# Patient Record
Sex: FEMALE | Race: WHITE | NOT HISPANIC OR LATINO
[De-identification: names, ages, dates, MRNs, and addresses within clinical notes are randomized per-mention and may not be internally consistent; named-entity substitution may affect disease eponyms.]

---

## 2017-04-18 ENCOUNTER — APPOINTMENT (OUTPATIENT)
Dept: ORTHOPEDIC SURGERY | Facility: CLINIC | Age: 57
End: 2017-04-18

## 2017-04-18 VITALS — HEART RATE: 53 BPM | SYSTOLIC BLOOD PRESSURE: 124 MMHG | DIASTOLIC BLOOD PRESSURE: 75 MMHG

## 2017-04-18 VITALS — HEIGHT: 60 IN | WEIGHT: 103 LBS | BODY MASS INDEX: 20.22 KG/M2

## 2017-04-18 DIAGNOSIS — Z85.3 PERSONAL HISTORY OF MALIGNANT NEOPLASM OF BREAST: ICD-10-CM

## 2017-04-18 DIAGNOSIS — M65.331 TRIGGER FINGER, RIGHT MIDDLE FINGER: ICD-10-CM

## 2017-04-18 DIAGNOSIS — Z78.9 OTHER SPECIFIED HEALTH STATUS: ICD-10-CM

## 2017-04-18 DIAGNOSIS — M65.332 TRIGGER FINGER, LEFT MIDDLE FINGER: ICD-10-CM

## 2017-04-18 DIAGNOSIS — Z56.0 UNEMPLOYMENT, UNSPECIFIED: ICD-10-CM

## 2017-04-18 DIAGNOSIS — S69.81XD OTHER SPECIFIED INJURIES OF RIGHT WRIST, HAND AND FINGER(S), SUBSEQUENT ENCOUNTER: ICD-10-CM

## 2017-04-18 RX ORDER — ESCITALOPRAM OXALATE 10 MG/1
10 TABLET ORAL
Qty: 30 | Refills: 0 | Status: ACTIVE | COMMUNITY
Start: 2017-03-11

## 2017-04-18 RX ORDER — CLOBETASOL PROPIONATE 0.5 MG/G
0.05 OINTMENT TOPICAL
Qty: 60 | Refills: 0 | Status: ACTIVE | COMMUNITY
Start: 2017-02-17

## 2017-04-18 SDOH — ECONOMIC STABILITY - INCOME SECURITY: UNEMPLOYMENT, UNSPECIFIED: Z56.0

## 2017-08-31 ENCOUNTER — APPOINTMENT (OUTPATIENT)
Dept: BREAST CENTER | Facility: CLINIC | Age: 57
End: 2017-08-31
Payer: COMMERCIAL

## 2017-08-31 VITALS
SYSTOLIC BLOOD PRESSURE: 120 MMHG | HEART RATE: 62 BPM | HEIGHT: 60 IN | DIASTOLIC BLOOD PRESSURE: 82 MMHG | BODY MASS INDEX: 20.62 KG/M2 | WEIGHT: 105 LBS

## 2017-08-31 DIAGNOSIS — Z86.000 PERSONAL HISTORY OF IN-SITU NEOPLASM OF BREAST: ICD-10-CM

## 2017-08-31 PROCEDURE — 99213 OFFICE O/P EST LOW 20 MIN: CPT

## 2018-08-07 ENCOUNTER — APPOINTMENT (OUTPATIENT)
Dept: BREAST CENTER | Facility: CLINIC | Age: 58
End: 2018-08-07
Payer: COMMERCIAL

## 2018-08-07 VITALS
HEIGHT: 60 IN | BODY MASS INDEX: 20.62 KG/M2 | DIASTOLIC BLOOD PRESSURE: 84 MMHG | WEIGHT: 105 LBS | SYSTOLIC BLOOD PRESSURE: 126 MMHG

## 2018-08-07 PROCEDURE — 99214 OFFICE O/P EST MOD 30 MIN: CPT

## 2018-08-07 RX ORDER — ASCORBIC ACID 500 MG
TABLET,CHEWABLE ORAL
Refills: 0 | Status: ACTIVE | COMMUNITY

## 2018-08-07 RX ORDER — GLUCOSAMINE/MSM/CHONDROIT SULF 500-166.6
TABLET ORAL
Refills: 0 | Status: ACTIVE | COMMUNITY

## 2019-05-16 ENCOUNTER — CHART COPY (OUTPATIENT)
Age: 59
End: 2019-05-16

## 2019-08-29 ENCOUNTER — APPOINTMENT (OUTPATIENT)
Dept: BREAST CENTER | Facility: CLINIC | Age: 59
End: 2019-08-29
Payer: COMMERCIAL

## 2019-08-29 VITALS
WEIGHT: 105 LBS | BODY MASS INDEX: 20.62 KG/M2 | HEIGHT: 60 IN | SYSTOLIC BLOOD PRESSURE: 121 MMHG | DIASTOLIC BLOOD PRESSURE: 74 MMHG | HEART RATE: 55 BPM

## 2019-08-29 DIAGNOSIS — Z86.000 PERSONAL HISTORY OF IN-SITU NEOPLASM OF BREAST: ICD-10-CM

## 2019-08-29 PROCEDURE — 99214 OFFICE O/P EST MOD 30 MIN: CPT

## 2019-08-29 NOTE — PHYSICAL EXAM
[Normocephalic] : normocephalic [Sclera nonicteric] : sclera nonicteric [Conjunctiva pink] : conjunctiva pink [Supple] : supple [No Supraclavicular Adenopathy] : no supraclavicular adenopathy [No Cervical Adenopathy] : no cervical adenopathy [No Thyromegaly] : no thyromegaly [Clear to Auscultation Bilat] : clear to auscultation bilaterally [Normal Sinus Rhythm] : normal sinus rhythm [No Rubs] : no pericardial rub [No Gallops] : no gallops [Examined in the supine and seated position] : examined in the supine and seated position [Symmetrical] : symmetrical [Grade 1] : Ptosis Grade 1 [No dominant masses] : no dominant masses in right breast  [No dominant masses] : no dominant masses left breast [No Nipple Retraction] : no left nipple retraction [No Nipple Discharge] : no left nipple discharge [No Axillary Lymphadenopathy] : no left axillary lymphadenopathy [Soft] : abdomen soft [Normal Bowel Sounds] : normal bowel sounds  [No Hepato-Splenomegaly] : no hepato-splenomegaly [No Swelling] : no swelling [de-identified] : Periareolar lumpectomy incision at 12:00.

## 2019-08-29 NOTE — HISTORY OF PRESENT ILLNESS
[FreeTextEntry1] : 59 year old female with a history of DCIS of the left breast presents for a surveillance breast examination.\par She underwent a left lumpectomy/radiation therapy in 2006 at age 45.  She had a negative genetic panel.

## 2019-08-29 NOTE — PAST MEDICAL HISTORY
[Menarche Age ____] : age at menarche was [unfilled] [Definite ___ (Date)] : the last menstrual period was [unfilled] [Total Preg ___] : G[unfilled] [FreeTextEntry3] : Irregular

## 2021-05-31 NOTE — CONSULT LETTER
CONSULTATION NOTE - GENERAL NEUROLOGY    CHIEF COMPLAINT  Tingling in the left fingers and left toes    HISTORY OF PRESENT ILLNESS  Ms. Chiu is a  69 year old year old female, with a significant past medical history of rheumatoid arthritis, fibromyalgia, osteoporosis, cervical cancer, and shingles who is being seen in neurologic consultation on 5/31/2021 for tingling in the left hand and left foot.   Patient originally presented to [facility] on 5/30/2021 for tingling and history was obtained from review of provider notes and speaking to the patient.      On 5/30/21, patient started having tingling in the left hand and left foot at 5:30PM which lasted until 11PM and then resolved. Symptoms started in the hand first (tingling in all her fingers) and then she had it in the foot. Patient also had a slight headache which she rated a 1-2/10 which then resolved spontaneously. She denies any nausea, vomiting, photophobia or phonophobia with her symptoms. She denies facial droop, slurring of speech, left sided weakness. In the ER, CT of the head was within normal limits.  MRI of the brain was normal. MRA of the head and neck were normal.     Currently, patient has no complaints at this time.     HISTORIES:  Patient Active Problem List    Diagnosis Date Noted   • Rheumatoid arthritis (CMS/Formerly [facility]) 05/13/2021     Priority: Low   • Environmental allergies 09/24/2020     Priority: Low   • Acquired trigger finger of both middle fingers 09/07/2020     Priority: Low   • Osteopenia of multiple sites 11/09/2019     Priority: Low   • Esophageal reflux 03/27/2019     Priority: Low   • Allergic rhinitis 03/27/2019     Priority: Low   • Family history of cardiac disorder 03/27/2019     Priority: Low   • FH: diabetes mellitus 03/27/2019     Priority: Low   • Malignant tumor of cervix (CMS/Formerly [facility]) 03/27/2019     Priority: Low   • Non-neoplastic nevus 03/27/2019     Priority: Low   • Pure hypercholesterolemia 03/27/2019      [Dear  ___] : Dear ~GINGER, Priority: Low   • Vitamin D deficiency 03/27/2019     Priority: Low   • Fibromyalgia 02/13/2019     Priority: Low   • Osteopenia of femoral neck 02/13/2019     Priority: Low   • Seasonal allergies 08/18/2015     Priority: Low       Past Medical History:   Diagnosis Date   • Esophageal reflux    • Fibromyalgia    • Osteopenia    • Vitiligo        No past surgical history on file.    Family History   Problem Relation Age of Onset   • Dementia/Alzheimers Mother    • Kidney disease Father    • Myocardial Infarction Father    • Coronary Artery Disease Father 60        CABG, Carotids   • Cirrhosis Paternal Grandmother    • Cancer, Pancreatic Other    • Cystic Fibrosis Neg Hx    • Stomach Cancer Neg Hx    • Cancer, Rectal Neg Hx        Social History     Tobacco Use   • Smoking status: Never Smoker   • Smokeless tobacco: Never Used   Substance Use Topics   • Alcohol use: Yes     Alcohol/week: 1.0 standard drinks     Types: 1 Glasses of wine per week     Comment: every night   • Drug use: Not Currently       INPATIENT MEDICATIONS:  • sodium chloride (PF)  2 mL Intracatheter 2 times per day   • amitriptyline  30 mg Oral Nightly   • PARoxetine  20 mg Oral Daily   • pantoprazole  40 mg Oral QAM AC   • moxifloxacin  400 mg Oral Daily   • folic acid  1 mg Oral Daily   • Non-formulary  1 each Oral See Admin Instructions       As needed meds: sodium chloride     Continuous meds:        HOME MEDICATIONS:  Medications Prior to Admission   Medication Sig Dispense Refill   • METHOTREXATE PO      • FOLIC ACID PO      • famotidine (PEPCID) 20 MG tablet Take 1 tablet by mouth 2 times daily. Take 30 min before breakfast and 30 min before dinner 180 tablet 3   • LORazepam (ATIVAN) 0.5 MG tablet Take 1 tablet 35-45 minutes before PET scan. May repeat x 1 right before the scan. 2 tablet 0   • colchicine (COLCRYS) 0.6 MG tablet      • ketoconazole (NIZORAL) 2 % shampoo      • predniSONE (DELTASONE) 10 MG tablet      • omeprazole (PriLOSEC) 40  [Courtesy Letter:] : I had the pleasure of seeing your patient, [unfilled], in my office today. MG capsule Take 1 capsule by mouth daily. Take 30 minutes before breakfast 30 capsule 1   • amitriptyline (ELAVIL) 10 MG tablet TAKE 3 TABLETS NIGHTLY 270 tablet 0   • PARoxetine (PAXIL) 20 MG tablet TAKE 1 TABLET AS DIRECTED 90 tablet 0   • Cholecalciferol (VITAMIN D3 PO) Take 2,000 Units by mouth.     • azelastine (ASTELIN) 0.1 % nasal spray INSERT 2 SQUIRTS IN EACH NOSTRIL TWICE DAILY     • mometasone (NASONEX) 50 MCG/ACT nasal spray 2 SPRAYS BID     • Multiple Vitamins-Minerals (PRESERVISION AREDS) capsule 1 TABLET TWICE A DAY         ALLERGIES:  Allergies as of 05/30/2021 - Reviewed 05/30/2021   Allergen Reaction Noted   • Amoxicillin Other (See Comments) and RASH 02/02/2016   • Coffee   (food or med) Cough 09/24/2020   • Dust mite extract Other (See Comments) 05/18/2018   • No name available Other (See Comments) 05/18/2018        REVIEW OF SYSTEMS :   10 point review of systems was unremarkable other than as documented in the HPI.    PHYSICAL EXAM  Vitals:  BP (!) 141/78   Pulse 82   Temp 97.9 °F (36.6 °C) (Oral)   Resp 16   Ht 5' 5\" (1.651 m)   Wt 60.1 kg (132 lb 7.9 oz)   BMI 22.05 kg/m²   BSA 1.66 m²   General: Well-developed, well-nourished female.  In no acute distress.  Head & Neck:  Normocephalic and atraumatic.   No carotid bruits.   EENT: Normal conjunctiva.  No nasal drainage.   Normal appearance to ear structures.  Heart: Normal rate, regular rhythm without murmur.  Lungs:  Clear to auscultation bilaterally.  No rales, rhonchi or rubs.  Extremities:  No edema in lower extremities. No amputations.  Musculoskeletal:  No bony tenderness or deformities.   Skin:  Warm and dry.  No obvious rashes noted.  Psychiatric:  Affect was appropriate to situation and mood was normal.  Neurologic:  (conscious exam)  Mentation: Alert and awake. Oriented to person and place. Speech is fluent without any anomia or dysarthria.  Patient is able to provide adequate history with a good fund of knowledge.  Short and  [Please see my note below.] : Please see my note below. [Sincerely,] : Sincerely, long term memory was within normal range for age.  No evidence of obvious cognitive impairment.   Cranial Nerves:    CN I: Not tested.  CN II: Visual acuity grossly intact. Visual fields full to confrontational testing. Funduscopic exam reveals normal discs, vessels and retina.  Pupils equal and reactive (CN II/III)  CN III, IV, VI: Extraocular muscles are intact without nystagmus or gaze paresis. There is no ptosis.  CN V: Facial sensation intact V1-V3 bilaterally.  Muscles of mastication normal.  CN VII: Facial movement full and symmetric.  CN VIII: Hearing intact to a speaking voice.  CN IX, X: Palate elevates in the midline, normal gag, swallow, and phonation.  CN XI: Sternocleidomastoid and trapezius strength is symmetric and full  CN XII: Tongue protrudes midline without atrophy or fasciculations.  Cerebellar exam:  Finger-nose-finger and rapid alternating movements are intact.  Tandem gait is normal.  Romberg steady eyes open and closed.  Motor exam (including gait and reflexes):  Normal gait with abililty to walk unassisted.  Able to walk on heels and toes without any difficulties. Strength in all 4 extremities is 5/5.  Reflex examination was 2/4 in upper and lower extremities.  Plantar response was flexor.  Sensory exam:  Normal sensation to pin prick, temperature and vibration in all 4 extremities.      LABORATORY:  I have reviewed the pertinent laboratory tests:    Recent Labs   Lab 05/30/21 1954   WBC 9.5   RBC 4.25   HGB 14.0   HCT 40.1        Recent Labs   Lab 05/30/21 1954   SODIUM 140   POTASSIUM 3.4   CHLORIDE 105   CO2 31   BUN 21*   CREATININE 0.75   CALCIUM 9.0   ALBUMIN 3.8   BILIRUBIN 0.3   ALKPT 85   GPT 36   AST 26   GLUCOSE 137*     No results found  No results found  Hemoglobin A1C (%)   Date Value   02/01/2021 5.4   ]    IMAGING/OTHER TESTING:  I have reviewed the pertinent imaging/study reports.      XR CHEST PA OR AP 1 VIEW   Final Result      No significant interval change.          Electronically Signed by: DILIA AGUILAR M.D.    Signed on: 5/31/2021 12:42 AM          MRI BRAIN W WO CONTRAST   Final Result      Normal MRI of the brain.      Normal MRA of the brain and neck.            Electronically Signed by: DILIA AGUILAR M.D.    Signed on: 5/30/2021 11:24 PM          MRA HEAD WO CONTRAST   Final Result      Normal MRI of the brain.      Normal MRA of the brain and neck.            Electronically Signed by: DILIA AGUILAR M.D.    Signed on: 5/30/2021 11:24 PM          MRA NECK W WO CONTRAST   Final Result      Normal MRI of the brain.      Normal MRA of the brain and neck.            Electronically Signed by: DILIA AGUILAR M.D.    Signed on: 5/30/2021 11:24 PM          XR CHEST PA OR AP 1 VIEW         CT HEAD WO CONTRAST   Final Result   No abnormalities.       Electronically Signed by: IWONA HIGGINBOTHAM M.D.    Signed on: 5/30/2021 9:39 PM                      ASSESSMENT/PLAN  This is a  69 year old  female, with a sig PMHx of rheumatoid arthritis, fibromyalgia, osteoporosis, cervical cancer, and shingles being seen in neurologic consultation on 5/31/2021 for  Tingling in the left hand and foot.  Patient was orginally admitted on 5/30/2021 for tingling.     1. Tingling sensations in the left hand and foot and associated headache- resolved  - Doubt right hemispheric stroke or ischemic event. MRI of the brain, MRA of the head and neck were negative. Unlikely to be medication side effect from Avelox (would not cause focal deficits) nor due to spinal stenosis (patient denies radiating pain)  - Patient can get an EMG as outpatient for further evaluation for neuropathy.    3. Multiple comorbidities- rheumatoid arthritis, fibromyalgia, osteoporosis, cervical cancer, and shingles    Plan  - No need for repeat MRI or further ischemic workup.   - EMG as outpatient with Dr. Shelton, Dr. Goins, or Dr. Dolan  - Patient is neurologically stable for discharge. No further recommendations. Will  [FreeTextEntry2] : Elly Herrera DO [FreeTextEntry3] : Tammi Bowden MD FACS\par  sign off.       .Rinku Byers DO   (available via [employer])  [facility] - Neurology Attending  Date: 5/31/2021

## 2024-08-23 ENCOUNTER — EMERGENCY (EMERGENCY)
Facility: HOSPITAL | Age: 64
LOS: 0 days | Discharge: HOME | End: 2024-08-23
Attending: STUDENT IN AN ORGANIZED HEALTH CARE EDUCATION/TRAINING PROGRAM
Payer: COMMERCIAL

## 2024-08-23 VITALS
OXYGEN SATURATION: 99 % | TEMPERATURE: 98 F | RESPIRATION RATE: 16 BRPM | SYSTOLIC BLOOD PRESSURE: 133 MMHG | HEART RATE: 62 BPM | DIASTOLIC BLOOD PRESSURE: 70 MMHG

## 2024-08-23 VITALS — HEIGHT: 60 IN | WEIGHT: 106.92 LBS
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
## 2024-08-23 DIAGNOSIS — M25.532 PAIN IN LEFT WRIST: ICD-10-CM

## 2024-08-23 DIAGNOSIS — S52.502A UNSPECIFIED FRACTURE OF THE LOWER END OF LEFT RADIUS, INITIAL ENCOUNTER FOR CLOSED FRACTURE: ICD-10-CM
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
## 2024-08-23 PROCEDURE — 99285 EMERGENCY DEPT VISIT HI MDM: CPT

## 2024-08-23 PROCEDURE — 25605 CLTX DST RDL FX/EPHYS SEP W/: CPT | Mod: LT

## 2024-08-23 PROCEDURE — 99285 EMERGENCY DEPT VISIT HI MDM: CPT | Mod: 25

## 2024-08-23 PROCEDURE — 73090 X-RAY EXAM OF FOREARM: CPT | Mod: LT

## 2024-08-23 PROCEDURE — 73090 X-RAY EXAM OF FOREARM: CPT | Mod: 26,LT

## 2024-08-23 PROCEDURE — 73130 X-RAY EXAM OF HAND: CPT | Mod: LT

## 2024-08-23 PROCEDURE — 73130 X-RAY EXAM OF HAND: CPT | Mod: 26,LT

## 2024-08-23 PROCEDURE — 73110 X-RAY EXAM OF WRIST: CPT | Mod: LT

## 2024-08-23 PROCEDURE — 73110 X-RAY EXAM OF WRIST: CPT | Mod: 26,LT,76

## 2024-08-23 RX ORDER — ACETAMINOPHEN 500 MG
1000 TABLET ORAL ONCE
Refills: 0 | Status: COMPLETED | OUTPATIENT
Start: 2024-08-23 | End: 2024-08-23

## 2024-08-23 RX ADMIN — Medication 1000 MILLIGRAM(S): at 20:45

## 2024-08-23 NOTE — ED STATDOCS - PHYSICAL EXAMINATION
GENERAL: A&Ox4, non-toxic appearing, no acute distress  HEENT: NCAT, EOMI, oral mucosa moist, normal conjunctiva  RESP: no respiratory distress, speaking in full sentences  CV: RRR, 2+ radial pulses  MSK: Swelling of the left distal radius on the volar aspect with bony tenderness, compartments soft  NEURO: no focal sensory or motor deficits, CN 2-12 grossly intact, AIN/PIN/R/U intact, SILT  SKIN: warm, normal color, well perfused, no rash  PSYCH: normal affect

## 2024-08-23 NOTE — ED STATDOCS - CLINICAL SUMMARY MEDICAL DECISION MAKING FREE TEXT BOX
64-year-old female presenting with fall on outstretched hand, complaining of left wrist pain.  Has swelling to the dorsal aspect of the left distal radius with bony tenderness concerning for fracture versus sprain.  Plan for Tylenol, x-rays, splint, reassess.

## 2024-08-23 NOTE — ED STATDOCS - CARE PROVIDER_API CALL
Sarbjit Tobar.  Orthopaedic Surgery  [address] [place] [address]  Phone: [phone]  Fax: [phone]  Follow Up Time:

## 2024-08-23 NOTE — ED ADULT NURSE NOTE - CHIEF COMPLAINT
The patient is a 64y Female complaining of wrist pain/injury.
Alert and oriented, no focal deficits, no motor or sensory deficits.

## 2024-08-23 NOTE — ED STATDOCS - PATIENT PORTAL LINK FT
You can access the FollowMyHealth Patient Portal offered by [facility] by registering at the following website: [email or web address]. By joining [facility]’s FollowMyHealth portal, you will also be able to view your health information using other applications (apps) compatible with our system.

## 2024-08-23 NOTE — CONSULT NOTE ADULT - SUBJECTIVE AND OBJECTIVE BOX
64y L Hand Dominant retired. Female patient presents to [facility] ED c/o severe L wrist pain s/p mechanical fall. She tripped over shoes in a hotel. Patient denies head hit or LOC. Localizing pain to distal radius. Denies radiation of pain. Pt denies numbness, tingling or burning. Patient denies any other injuries. Lives in [place] - is visiting for her HS reunion.    PMH:    PSH:    AH:  No Known Allergies    Meds: See med rec    T(C): 36.7 (08-23-24 @ 19:20)  HR: 64 (08-23-24 @ 19:20)  BP: 144/68 (08-23-24 @ 19:20)  RR: 20 (08-23-24 @ 19:20)  SpO2: 100% (08-23-24 @ 19:20)  Wt(kg): --    PE :  Gen: NAD, A/Ox3, Following commands    LUE:  Skin intact, visible deformity of wrist, + soft tissue swelling, no ecchymosis  Decreased ROM of Wrist 2/2 pain  Normal Elbow/Shoulder ROM w/o pain  + TTP over distal radius  No Snuff box TTP  + Rad Pulse   SILT C5-T1  +AIN/PIN/Ulnar/Radial/Musc/Median  compartments soft and compressible    Secondary Survey:   No TTP over bony prominences, SILT, palpable pulses, full/painless A/PROM, compartments soft. No TTP over spinous processes or paraspinal muscles at C/T/L spine. No palpable step off. No other injuries or complaints. Able to SLR BL. Negative logroll/heelstrike BL. Ambulating w/o assistance/pain.    Imaging:  XRay L Wrist  3 views of L Wrist demonstrates L distal radius fracture, intra-articular w/ posterior displacement of carpus/hand in relation to forearm. No other fx/dislocations noted.     Procedure Note:  After verbal consent obtained, ~10cc of 1% Lidocaine injected into area around DR/Ulna as hematoma block. LUE hung by fingers and reduction maneuver performed. A well padded sugartong splint was applied to Forearm/Wrist and mold held. SD XR obtained which show improved alignment of Fracture. Post reduction NV exam unchanged. Pt tolerated procedure well.    A/P:   64yFemale s/p Mech Fall w/ L Distal Radius Fracture s/p closed reduction and splinting.    -Pain control  -Strict Ice/Elevation to help with swelling  -NWB LUE with splint and sling  -Keep splint clean/dry/intact;  -Encourage active finger motion to help with swelling  -Pt aware of possible need for surgical intervention of distal radius fracture. Will FU as outpatient  -Pt made aware of signs and symptoms of compartment syndrome and acute carpal tunnel syndrome. Aware of need to return to ED if symptoms develop.  -Recommend follow up outpatient w/ Dr. Tobar in 2-3 days. Call office to schedule appointment.  -All Patient's and Family Member's questions answered. Patient/family understand and agree w/ plan.  -Will discuss w/ attending and advise if plan changes.  -Imaging and clinical presentation discussed w/ Dr. Tobar who is aware and agrees w/ above plan.

## 2024-08-23 NOTE — ED ADULT NURSE NOTE - NSFALLRISKINTERV_ED_ALL_ED

## 2024-08-23 NOTE — ED STATDOCS - PROGRESS NOTE DETAILS
signed Aylin Goddard PA-C Pt seen and evaluated initially in intake by Dr. Taylor.   Pt with distal radius fx s/p fall. I spoke to Dr Tobar, resident will see pt in ED to splint/reduce. signed Aylin Goddard PA-C   Pt reduced and splinted in ED by resident Brijesh. Pt may f/u as outpt with Ekaterina on Monday. pt lives in [place] but is not sure when she is going back. Pt was up here visiting friends for a high school reunion. Pt declines rx for analgesia. friends driving her home. return precautions given. Pt feeling well at DC, agrees with DC and plan of care.

## 2024-08-23 NOTE — ED STATDOCS - OBJECTIVE STATEMENT
64 year old female with PMHx of ; presents to ED c/o left wrist injury. Patient states she tripped on a shoe in her hotel room and fell onto LUE. No other injuries. Denies head-strike, LOC.

## 2024-08-23 NOTE — ED STATDOCS - NSFOLLOWUPINSTRUCTIONS_ED_ALL_ED_FT
FOLLOW UP WITH DR SAUCEDO ON MONDAY. CALL THE OFFICE TO MAKE AN APPOINTMENT. RETURN TO ER FOR ANY WORSENING SYMPTOMS OR NEW CONCERNS.     Wrist Fracture Treated With Immobilization     A wrist fracture is a break or crack in one of the bones of the wrist. The wrist is made up of eight small bones at the palm of the hand (carpal bones) and two long bones that make up the forearm (radius and ulna).  If the joint is stable and the bones are still in their normal position (nondisplaced), the injury may be treated with immobilization. This involves the use of a cast, splint, or sling to hold the wrist in place. Immobilization ensures that the bones continue to stay in the correct position while the wrist is healing.  What are the causes?  This condition may be caused by:  A direct force to the wrist.Falling on an outstretched hand.Trauma, such as a car accident or a fall.What increases the risk?  The following factors may make you more likely to develop this condition:  Doing contact sports or high-risk sports such as skiing, biking, and ice skating.Taking steroid medicines.Smoking.Being female.Being .Drinking more than three alcoholic beverages per day.Having a condition that weakens the bones (osteoporosis).Being older.Having a history of previous fractures.What are the signs or symptoms?  Symptoms of this condition include:  Pain.Swelling.Bruising.Not being able to move the wrist normally.Additionally, the wrist may hang in an odd position or appear deformed.  How is this diagnosed?  This condition may be diagnosed based on a physical exam and X-rays. You may also have a CT scan or MRI.  How is this treated?  Treatment for this condition involves wearing a cast, splint, or sling until the injured area is stable enough for you to begin range-of-motion exercises. You may also be prescribed pain medicine.  Follow these instructions at home:  If you have a cast:     Do not stick anything inside the cast to scratch your skin. Doing that increases your risk of infection.Check the skin around the cast every day. Tell your health care provider about any concerns.You may put lotion on dry skin around the edges of the cast. Do not put lotion on the skin underneath the cast.Keep the cast clean and dry.If you have a splint or sling:     Wear the splint or sling as told by your health care provider. Remove it only as told by your health care provider.Loosen the splint or sling if your fingers tingle, become numb, or turn cold and blue.Keep the splint or sling clean and dry.Bathing     Do not take baths, swim, or use a hot tub until your health care provider approves. Ask your health care provider if you may take showers. You may only be allowed to take sponge baths.If your cast, splint, or sling is not waterproof:  Do not let it get wet.Cover it with a watertight covering when you take a bath or a shower.If you have a sling, remove it for bathing only if your health care provider tells you it is safe to do that.Managing pain, stiffness, and swelling        If directed, put ice on the injured area.  If you have a removable splint or sling, remove it as told by your health care provider.Put ice in a plastic bag.Place a towel between your skin and the bag or between your cast and the bag.Leave the ice on for 20 minutes, 2–3 times a day.Move your fingers often to avoid stiffness and to lessen swelling.Raise (elevate) the injured area above the level of your heart while you are sitting or lying down.Driving     Do not drive or use heavy machinery while taking prescription pain medicine.Ask your health care provider when it is safe to drive if you have a cast, splint, or sling on your wrist.Activity     Return to your normal activities as told by your health care provider. Ask your health care provider what activities are safe for you.Do not lift with your injured wrist until your health care provider approves.Do range-of-motion exercises only as told by your health care provider or physical therapist.General instructions     Do not put pressure on any part of the cast or splint until it is fully hardened. This may take several hours.Take over-the-counter and prescription medicines only as told by your health care provider.Do not use any products that contain nicotine or tobacco, such as cigarettes, e-cigarettes, and chewing tobacco. These can delay bone healing. If you need help quitting, ask your health care provider.If you were prescribed pain medicine, take steps to prevent or treat constipation. Your health care provider may recommend that you:  Drink enough fluid to keep your urine pale yellow.Take over-the-counter or prescription medicines.Eat foods that are high in fiber, such as beans, whole grains, and fresh fruits and vegetables.Limit foods that are high in fat and processed sugars, such as fried or sweet foods.Keep all follow-up visits as told by your health care provider. This is important.Contact a health care provider if:  Your cast, splint, or sling is damaged or loose.You have any new pain, swelling, or bruising.Your pain, swelling, and bruising do not improve.You have a fever.You have chills.Get help right away if:  Your skin or fingers on your injured arm turn blue or gray.Your arm feels cold or numb.You have severe pain in your injured wrist.Summary  A wrist fracture is a break or crack in one of the bones of the wrist.If the joint is stable and the bones are still in their normal position, the injury may be treated by wearing a cast, splint, or sling.If you have a cast, check the skin around the cast every day. Tell your health care provider about any concerns.If you have a splint or sling, wear it as told by your health care provider. Remove it only as told by your health care provider.This information is not intended to replace advice given to you by your health care provider. Make sure you discuss any questions you have with your health care provider.

## 2024-08-23 NOTE — ED ADULT NURSE NOTE - OBJECTIVE STATEMENT
pt presents to er c/o L. wrist injury. pt reports tripping over shoe and landing on wrist. -headstrike, -bloodthinners. pt reports pain in wrist. last meal around 4pm.

## 2024-08-23 NOTE — ED ADULT TRIAGE NOTE - CHIEF COMPLAINT QUOTE
Pt presents from home s/p trip and fall at home. States she was bending over to take her shoes off and tripped, landing on her L wrist. minor deformity noted to wrist. No open skin or wound. Denies head strike. Pain 10/10. Icing wrist in triage.